# Patient Record
Sex: MALE | Race: WHITE | NOT HISPANIC OR LATINO | URBAN - NONMETROPOLITAN AREA
[De-identification: names, ages, dates, MRNs, and addresses within clinical notes are randomized per-mention and may not be internally consistent; named-entity substitution may affect disease eponyms.]

---

## 2021-08-16 ENCOUNTER — OFFICE VISIT (OUTPATIENT)
Dept: URBAN - NONMETROPOLITAN AREA CLINIC 2 | Facility: CLINIC | Age: 51
End: 2021-08-16

## 2021-08-20 ENCOUNTER — DASHBOARD ENCOUNTERS (OUTPATIENT)
Age: 51
End: 2021-08-20

## 2021-08-23 ENCOUNTER — LAB OUTSIDE AN ENCOUNTER (OUTPATIENT)
Dept: URBAN - NONMETROPOLITAN AREA CLINIC 2 | Facility: CLINIC | Age: 51
End: 2021-08-23

## 2021-08-23 ENCOUNTER — WEB ENCOUNTER (OUTPATIENT)
Dept: URBAN - NONMETROPOLITAN AREA CLINIC 2 | Facility: CLINIC | Age: 51
End: 2021-08-23

## 2021-08-23 ENCOUNTER — OFFICE VISIT (OUTPATIENT)
Dept: URBAN - NONMETROPOLITAN AREA CLINIC 2 | Facility: CLINIC | Age: 51
End: 2021-08-23
Payer: COMMERCIAL

## 2021-08-23 VITALS
TEMPERATURE: 97.1 F | DIASTOLIC BLOOD PRESSURE: 90 MMHG | WEIGHT: 263.8 LBS | HEART RATE: 99 BPM | HEIGHT: 70 IN | SYSTOLIC BLOOD PRESSURE: 147 MMHG | BODY MASS INDEX: 37.77 KG/M2

## 2021-08-23 DIAGNOSIS — Z79.01 ANTICOAGULANT LONG-TERM USE: ICD-10-CM

## 2021-08-23 DIAGNOSIS — Z12.11 COLON CANCER SCREENING: ICD-10-CM

## 2021-08-23 PROBLEM — 711150003: Status: ACTIVE | Noted: 2021-08-16

## 2021-08-23 PROCEDURE — 99204 OFFICE O/P NEW MOD 45 MIN: CPT | Performed by: INTERNAL MEDICINE

## 2021-08-23 RX ORDER — ATORVASTATIN CALCIUM 40 MG/1
TABLET, FILM COATED ORAL
Qty: 90 | Refills: 2 | Status: ACTIVE | COMMUNITY

## 2021-08-23 RX ORDER — AMLODIPINE BESYLATE 10 MG/1
TABLET ORAL
Qty: 30 | Refills: 2 | Status: ACTIVE | COMMUNITY

## 2021-08-23 RX ORDER — APIXABAN 5 MG/1
TABLET, FILM COATED ORAL
Qty: 60 | Status: ACTIVE | COMMUNITY

## 2021-08-23 NOTE — HPI-TODAY'S VISIT:
8/23/2021 The patient presents for evaluation of colorectal cancer screening. They deny any significant GI complaints. There is no report of rectal bleeding. They do not report any issues with anemia. They have never had a colonoscopy in the past. Otherwise the patient is healthy, risks and benefits were discussed and they agree to pursue colon cancer screening with colonoscopy. He had a terrible HA 6 months ago and dx with a blood clot in the back of his head. He was cleared by neurology and seeing Dr Hernandez. He is on Elquis. CS

## 2021-10-05 ENCOUNTER — OFFICE VISIT (OUTPATIENT)
Dept: URBAN - NONMETROPOLITAN AREA SURGERY CENTER 1 | Facility: SURGERY CENTER | Age: 51
End: 2021-10-05
Payer: COMMERCIAL

## 2021-10-05 PROCEDURE — G8907 PT DOC NO EVENTS ON DISCHARG: HCPCS | Performed by: INTERNAL MEDICINE

## 2021-10-05 PROCEDURE — G0121 COLON CA SCRN NOT HI RSK IND: HCPCS | Performed by: INTERNAL MEDICINE
